# Patient Record
Sex: FEMALE | Race: WHITE | NOT HISPANIC OR LATINO | URBAN - METROPOLITAN AREA
[De-identification: names, ages, dates, MRNs, and addresses within clinical notes are randomized per-mention and may not be internally consistent; named-entity substitution may affect disease eponyms.]

---

## 2019-07-17 ENCOUNTER — OFFICE VISIT (OUTPATIENT)
Dept: PODIATRY | Facility: CLINIC | Age: 54
End: 2019-07-17
Payer: COMMERCIAL

## 2019-07-17 VITALS
HEIGHT: 63 IN | WEIGHT: 293 LBS | DIASTOLIC BLOOD PRESSURE: 90 MMHG | BODY MASS INDEX: 51.91 KG/M2 | SYSTOLIC BLOOD PRESSURE: 132 MMHG

## 2019-07-17 DIAGNOSIS — Z79.4 CONTROLLED TYPE 2 DIABETES MELLITUS WITHOUT COMPLICATION, WITH LONG-TERM CURRENT USE OF INSULIN (HCC): Primary | ICD-10-CM

## 2019-07-17 DIAGNOSIS — E11.9 CONTROLLED TYPE 2 DIABETES MELLITUS WITHOUT COMPLICATION, WITH LONG-TERM CURRENT USE OF INSULIN (HCC): Primary | ICD-10-CM

## 2019-07-17 PROCEDURE — 99202 OFFICE O/P NEW SF 15 MIN: CPT | Performed by: PODIATRIST

## 2019-07-17 RX ORDER — FLUTICASONE PROPIONATE 50 MCG
SPRAY, SUSPENSION (ML) NASAL
COMMUNITY
Start: 2019-06-27

## 2019-07-17 RX ORDER — ACETAMINOPHEN, DEXTROMETHORPHAN HBR, DOXYLAMINE SUCCINATE 650; 30; 12.5 MG/30ML; MG/30ML; MG/30ML
LIQUID ORAL
COMMUNITY
Start: 2019-06-04

## 2019-07-17 RX ORDER — FLUTICASONE FUROATE AND VILANTEROL 100; 25 UG/1; UG/1
1 POWDER RESPIRATORY (INHALATION) DAILY
COMMUNITY
Start: 2019-02-07

## 2019-07-17 RX ORDER — LAMOTRIGINE 200 MG/1
TABLET ORAL
COMMUNITY
Start: 2019-07-03

## 2019-07-17 RX ORDER — MONTELUKAST SODIUM 10 MG/1
TABLET ORAL
COMMUNITY
Start: 2018-08-22

## 2019-07-17 RX ORDER — RISPERIDONE 1 MG/1
TABLET, FILM COATED ORAL
COMMUNITY

## 2019-07-17 RX ORDER — TOPIRAMATE 25 MG/1
TABLET ORAL
COMMUNITY

## 2019-07-17 RX ORDER — SUMATRIPTAN 50 MG/1
50 TABLET, FILM COATED ORAL
COMMUNITY
Start: 2019-06-26 | End: 2020-06-25

## 2019-07-17 RX ORDER — TRAZODONE HYDROCHLORIDE 50 MG/1
TABLET ORAL
COMMUNITY
Start: 2019-07-10

## 2019-07-17 RX ORDER — DORZOLAMIDE HYDROCHLORIDE AND TIMOLOL MALEATE 20; 5 MG/ML; MG/ML
SOLUTION/ DROPS OPHTHALMIC
COMMUNITY

## 2019-07-17 RX ORDER — CLOPIDOGREL BISULFATE 75 MG/1
TABLET ORAL
COMMUNITY

## 2019-07-17 RX ORDER — TRAMADOL HYDROCHLORIDE 50 MG/1
TABLET ORAL
COMMUNITY
Start: 2019-06-07

## 2019-07-17 RX ORDER — BUPROPION HYDROCHLORIDE 300 MG/1
300 TABLET ORAL
COMMUNITY
Start: 2019-05-31

## 2019-07-17 RX ORDER — FLUVOXAMINE MALEATE 100 MG
TABLET ORAL
COMMUNITY
Start: 2019-06-27

## 2019-07-17 RX ORDER — CLINDAMYCIN HYDROCHLORIDE 300 MG/1
CAPSULE ORAL
COMMUNITY

## 2019-07-17 RX ORDER — ESCITALOPRAM OXALATE 20 MG/1
TABLET ORAL
COMMUNITY
Start: 2016-12-04

## 2019-07-17 RX ORDER — HUMAN INSULIN 100 [IU]/ML
INJECTION, SUSPENSION SUBCUTANEOUS
Refills: 2 | COMMUNITY
Start: 2019-05-01

## 2019-07-17 RX ORDER — ISOSORBIDE MONONITRATE 30 MG/1
TABLET, EXTENDED RELEASE ORAL
COMMUNITY
Start: 2019-07-08

## 2019-07-17 RX ORDER — CARVEDILOL 6.25 MG/1
TABLET ORAL
COMMUNITY
Start: 2019-01-28

## 2019-07-17 RX ORDER — CYCLOBENZAPRINE HCL 5 MG
5-10 TABLET ORAL DAILY PRN
COMMUNITY
Start: 2019-03-27

## 2019-07-17 RX ORDER — PREDNISONE 20 MG/1
TABLET ORAL
COMMUNITY

## 2019-07-17 RX ORDER — CLONAZEPAM 0.5 MG/1
TABLET ORAL
COMMUNITY
Start: 2009-08-20

## 2019-07-17 RX ORDER — LOSARTAN POTASSIUM 25 MG/1
TABLET ORAL
COMMUNITY
Start: 2019-07-01

## 2019-07-17 RX ORDER — BLOOD SUGAR DIAGNOSTIC
STRIP MISCELLANEOUS
COMMUNITY
Start: 2019-06-26

## 2019-07-17 RX ORDER — HYDROCHLOROTHIAZIDE 25 MG/1
TABLET ORAL
COMMUNITY
Start: 2019-01-28

## 2019-07-17 RX ORDER — LORATADINE 10 MG/1
TABLET ORAL
COMMUNITY

## 2019-07-17 NOTE — PROGRESS NOTES
Assessment/Plan:    Discussed principles of diabetic foot care  Vascular status is within normal limits and sensorium is intact per Torrey-Shahrzad monofilament and tuning fork  Patient advised to continue with the compression socks as well as elevation of her legs  She already takes a diuretic  No edema is currently noted in her left foot  Edema in the ball of foot is relatively rare and may be secondary to neuropathy and not actually present  No definitive treatment needed for neuropathy  Patient knows to refrain from walking barefoot  She is typically wears diabetic shoes and insoles  She is rescheduled in 1 year  No problem-specific Assessment & Plan notes found for this encounter  Diagnoses and all orders for this visit:    Controlled type 2 diabetes mellitus without complication, with long-term current use of insulin ([address] )    Other orders  -     traZODone (DESYREL) 50 mg tablet  -     isosorbide mononitrate (IMDUR) 30 mg 24 hr tablet; isosorbide mononitrate ER 30 mg tablet,extended release 24 hr  -     buPROPion (WELLBUTRIN XL) 300 mg 24 hr tablet; Take 300 mg by mouth  -     lamoTRIgine (LaMICtal) 200 MG tablet  -     losartan (COZAAR) 25 mg tablet  -     hydrochlorothiazide (HYDRODIURIL) 25 mg tablet; hydrochlorothiazide 25 mg tablet  -     predniSONE 20 mg tablet; prednisone 20 mg tablet  -     risperiDONE (RisperDAL) 1 mg tablet; risperidone 1 mg tablet  -     SUMAtriptan (IMITREX) 50 mg tablet;  Take 50 mg by mouth  -     topiramate (TOPAMAX) 25 mg tablet; topiramate 25 mg tablet  -     traMADol (ULTRAM) 50 mg tablet; tramadol 50 mg tablet  -     polyethylene glycol (GAVILYTE-G) 4000 mL solution; GaviLyte-G 236 gram-22 74 gram-6 74 gram-5 86 gram oral solution  -     metFORMIN (GLUCOPHAGE) 1000 MG tablet; metformin 1,000 mg tablet  -     montelukast (SINGULAIR) 10 mg tablet; montelukast 10 mg tablet  -     loratadine (CLARITIN) 10 mg tablet; loratadine 10 mg tablet  -     Vallarie Laurent N RELION 100 UNIT/ML subcutaneous injection  -     UNIFINE PENTIPS PLUS 32G X 4 MM MISC  -     ipratropium (ATROVENT) 0 02 % nebulizer solution; ipratropium bromide 0 02 % solution for inhalation  -     Insulin Glargine (TOUJEO SOLOSTAR) 300 units/mL CONCETRATED U-300 injection pen; Toujeo SoloStar U-300 Insulin 300 unit/mL (1 5 mL) subcutaneous pen  -     fluticasone (FLONASE) 50 mcg/act nasal spray  -     fluvoxaMINE (LUVOX) 100 mg tablet  -     ACCU-CHEK GUIDE test strip  -     fluticasone-vilanterol (BREO ELLIPTA) 100-25 mcg/inh inhaler; Inhale 1 puff daily  -     fluticasone (FLONASE) 50 mcg/act nasal spray; fluticasone propionate 50 mcg/actuation nasal spray,suspension  -     carvedilol (COREG) 6 25 mg tablet; carvedilol 6 25 mg tablet  -     clindamycin (CLEOCIN) 300 MG capsule; clindamycin HCl 300 mg capsule  -     clonazePAM (KlonoPIN) 0 5 mg tablet; clonazepam 0 5 mg tablet  -     clopidogrel (PLAVIX) 75 mg tablet; clopidogrel 75 mg tablet  -     cyclobenzaprine (FLEXERIL) 5 mg tablet; Take 5-10 mg by mouth daily as needed  -     dorzolamide-timolol (COSOPT) 22 3-6 8 MG/ML ophthalmic solution; dorzolamide 22 3 mg-timolol 6 8 mg/mL eye drops  -     Dulaglutide (TRULICITY) 4 80 WB/4 1LX SOPN; Trulicity 5 98 AX/6 9 mL subcutaneous pen injector  -     escitalopram (LEXAPRO) 20 mg tablet; escitalopram 20 mg tablet          Subjective:      Patient ID: Carin Seip is a 47 y o  female  HPI     Patient, a type 2 diabetic utilizing insulin for control, presents for pedal assessment  Last A1c was 6 4  Patient has multiple health problems  They include 5 number alginate and multiple sclerosis in addition to the diabetes  Patient states that at times it feels as if her feet are swollen  This swelling also occurs in the ball of the left foot  Patient generally wear supportive stockings      The following portions of the patient's history were reviewed and updated as appropriate: allergies, current medications, past family history, past medical history, past social history, past surgical history and problem list     Review of Systems   Constitutional:        Obese   Cardiovascular: Positive for leg swelling  Musculoskeletal: Negative  Neurological:        MS             Objective:      /90   Ht 5' 3" (1 6 m)   Wt (!) 153 kg (337 lb)   BMI 59 70 kg/m²          Physical Exam   Cardiovascular: Pulses are no weak pulses  Pulses:       Dorsalis pedis pulses are 2+ on the right side, and 2+ on the left side  Posterior tibial pulses are 2+ on the right side, and 2+ on the left side  Feet:   Right Foot:   Skin Integrity: Positive for callus  Negative for ulcer, skin breakdown, erythema, warmth or dry skin  Left Foot:   Skin Integrity: Negative for ulcer, skin breakdown, erythema, warmth, callus or dry skin  Diabetic Foot Exam    Patient's shoes and socks removed  Right Foot/Ankle   Right Foot Inspection  Skin Exam: skin normal, skin intact, callus and callus no dry skin, no warmth, no erythema, no maceration, no abnormal color, no pre-ulcer and no ulcer                          Toe Exam: ROM and strength within normal limitsno swelling  Sensory   Vibration: intact  Proprioception: intact   Monofilament testing: intact  Vascular  Capillary refills: < 3 seconds  The right DP pulse is 2+  The right PT pulse is 2+  Right Toe  - Comprehensive Exam  Ecchymosis: none  Arch: normal  Hammertoes: absent  Claw Toes: absent  Swelling: none   Tenderness: none         Left Foot/Ankle  Left Foot Inspection  Skin Exam: skin normal and skin intactno dry skin, no warmth, no erythema, no maceration, normal color, no pre-ulcer, no ulcer and no callus                         Toe Exam: ROM and strength within normal limitsno swelling                   Sensory   Vibration: intact  Proprioception: intact  Monofilament: intact  Vascular  Capillary refills: < 3 seconds  The left DP pulse is 2+  The left PT pulse is 2+  Left Toe  - Comprehensive Exam  Ecchymosis: none  Arch: normal  Hammertoes: absent  Claw toes: absent  Swelling: none   Tenderness: none       Assign Risk Category:  Deformity present; No loss of protective sensation;  No weak pulses       Risk: 1

## 2020-08-21 ENCOUNTER — AMBUL SURGICAL CARE (OUTPATIENT)
Dept: URBAN - METROPOLITAN AREA SURGERY 32 | Facility: SURGERY | Age: 55
Setting detail: OPHTHALMOLOGY
End: 2020-08-21
Payer: COMMERCIAL

## 2020-08-21 DIAGNOSIS — H26.492: ICD-10-CM

## 2020-08-21 PROCEDURE — G8918 PT W/O PREOP ORDER IV AB PRO: HCPCS | Performed by: CLINIC/CENTER

## 2020-08-21 PROCEDURE — 66821 AFTER CATARACT LASER SURGERY: CPT | Performed by: CLINIC/CENTER

## 2020-08-21 PROCEDURE — G8907 PT DOC NO EVENTS ON DISCHARG: HCPCS | Performed by: CLINIC/CENTER

## 2021-01-15 ENCOUNTER — AMBUL SURGICAL CARE (OUTPATIENT)
Dept: URBAN - METROPOLITAN AREA SURGERY 32 | Facility: SURGERY | Age: 56
Setting detail: OPHTHALMOLOGY
End: 2021-01-15
Payer: COMMERCIAL

## 2021-01-15 DIAGNOSIS — H26.491: ICD-10-CM

## 2021-01-15 PROCEDURE — G8918 PT W/O PREOP ORDER IV AB PRO: HCPCS | Performed by: CLINIC/CENTER

## 2021-01-15 PROCEDURE — G8907 PT DOC NO EVENTS ON DISCHARG: HCPCS | Performed by: CLINIC/CENTER

## 2021-01-15 PROCEDURE — 66821 AFTER CATARACT LASER SURGERY: CPT | Performed by: CLINIC/CENTER
